# Patient Record
Sex: MALE | Race: WHITE | Employment: FULL TIME | URBAN - METROPOLITAN AREA
[De-identification: names, ages, dates, MRNs, and addresses within clinical notes are randomized per-mention and may not be internally consistent; named-entity substitution may affect disease eponyms.]

---

## 2024-02-22 ENCOUNTER — OFFICE VISIT (OUTPATIENT)
Age: 49
End: 2024-02-22
Payer: COMMERCIAL

## 2024-02-22 VITALS
DIASTOLIC BLOOD PRESSURE: 78 MMHG | BODY MASS INDEX: 24.25 KG/M2 | HEART RATE: 84 BPM | SYSTOLIC BLOOD PRESSURE: 120 MMHG | TEMPERATURE: 97.8 F | HEIGHT: 68 IN | WEIGHT: 160 LBS | OXYGEN SATURATION: 99 % | RESPIRATION RATE: 18 BRPM

## 2024-02-22 DIAGNOSIS — G44.86 CERVICOGENIC HEADACHE: ICD-10-CM

## 2024-02-22 DIAGNOSIS — M54.81 BILATERAL OCCIPITAL NEURALGIA: Primary | ICD-10-CM

## 2024-02-22 DIAGNOSIS — G81.11 RIGHT SPASTIC HEMIPARESIS (HCC): ICD-10-CM

## 2024-02-22 DIAGNOSIS — G24.3 CERVICAL DYSTONIA: ICD-10-CM

## 2024-02-22 PROCEDURE — 99204 OFFICE O/P NEW MOD 45 MIN: CPT | Performed by: PSYCHIATRY & NEUROLOGY

## 2024-02-22 RX ORDER — BUTALBITAL, ACETAMINOPHEN AND CAFFEINE 300; 40; 50 MG/1; MG/1; MG/1
1 CAPSULE ORAL EVERY 4 HOURS PRN
COMMUNITY
Start: 2024-02-16

## 2024-02-22 RX ORDER — BACLOFEN 10 MG/1
TABLET ORAL
Qty: 90 TABLET | Refills: 3 | Status: SHIPPED | OUTPATIENT
Start: 2024-02-22

## 2024-02-22 RX ORDER — NAPROXEN SODIUM 550 MG/1
550 TABLET ORAL 2 TIMES DAILY PRN
COMMUNITY
Start: 2024-02-04

## 2024-02-22 ASSESSMENT — PATIENT HEALTH QUESTIONNAIRE - PHQ9
SUM OF ALL RESPONSES TO PHQ9 QUESTIONS 1 & 2: 0
1. LITTLE INTEREST OR PLEASURE IN DOING THINGS: 0
SUM OF ALL RESPONSES TO PHQ QUESTIONS 1-9: 0
SUM OF ALL RESPONSES TO PHQ QUESTIONS 1-9: 0
2. FEELING DOWN, DEPRESSED OR HOPELESS: 0
SUM OF ALL RESPONSES TO PHQ QUESTIONS 1-9: 0
SUM OF ALL RESPONSES TO PHQ QUESTIONS 1-9: 0

## 2024-02-22 NOTE — PROGRESS NOTES
most when sleeping.  Advised to obtain travel donut to prevent head flexion when sleeping.  Advised to obtain back support.  Patient given brochure for neck and shoulder exercises to do.  Head CT without contrast was ordered to further assess.  Trial of baclofen 10 mg at bedtime initially and up to 10 mg 3 times daily if necessary.  Prescription was provided.  Potential referral to physical therapy for more aggressive manipulation and dry needling.  Potential occipital nerve blocks if condition worsens.    Exam also reveals elements of cervical dystonia.  Patient has an anterior head posture with right laterocollis and torticollis.  Conservative management as above.  Posture changes, neck and shoulder exercises and trial of baclofen.  Potential referral to physical therapy.  If condition persist then patient may benefit from chemodenervation with Botox.    Exam reveals residual spastic hemiparesis right upper greater than lower extremity with flexion posturing at the elbow, wrist and fingers with atrophy and right plantarflexion.  Residual changes from childhood issue status postcraniotomy.  Head CT without contrast was ordered.  Baclofen as above should also help his underlying spasticity.  All questions and concerns were answered.    This note was created using voice recognition software. Despite editing, there may be syntax errors.

## 2024-02-22 NOTE — PATIENT INSTRUCTIONS
your arms at your sides.  Keep your shoulders relaxed and down, not shrugged.  Squeeze your shoulder blades together. Hold for 6 seconds, then relax.  Repeat 8 to 12 times.  Chest-level pull (arms straight)    Sit or stand up straight. Grasp an exercise band with your hands about shoulder-width apart.  Relax your shoulders. With your palms facing down, hold your arms straight out in front of you.  Slowly pull straight out to the sides, squeezing your shoulder blades together. Keep your arms straight and at chest level. Do not pull your shoulders up toward your ears. Hold for 1 to 2 seconds.  Slowly return to your starting position.  Repeat 8 to 12 times.  Rowing    [place] your elastic tubing or band at about waist height. Take one end in each hand.  Sit or stand with your feet hip-width apart.  Hold your arms straight in front of you. Adjust your distance to create slight tension in the tubing or band.  Slightly tuck your chin. Relax your shoulders.  Without shrugging your shoulders, pull straight back. Your elbows will pass alongside your waist.  Pull-downs    [place] your elastic tubing or band in the top of a closed door. Take one end in each hand.  Either sit or stand, depending on what is more comfortable. If you feel unsteady, sit on a chair.  Start with your arms up and comfortably apart, elbows straight. There should be a slight tension in the tubing or band.  Slightly tuck your chin, and look straight ahead.  Keeping your back straight, slowly pull down and back, bending your elbows.  Stop where your hands are level with your chin, in a \"goalpost\" position.  Repeat 8 to 12 times.  Chest T stretch    Lie on your back with your knees bent and your feet about hip-width apart.  Tuck your chin, and relax your shoulders.  Reach your arms straight out to the sides. If you don't feel a mild stretch in your shoulders and across your chest, use a foam roll or a tightly rolled towel or blanket under your spine, from

## 2024-03-05 ENCOUNTER — HOSPITAL ENCOUNTER (OUTPATIENT)
Facility: HOSPITAL | Age: 49
Discharge: HOME | End: 2024-03-08
Attending: PSYCHIATRY & NEUROLOGY
Payer: COMMERCIAL

## 2024-03-05 DIAGNOSIS — M54.81 BILATERAL OCCIPITAL NEURALGIA: ICD-10-CM

## 2024-03-05 DIAGNOSIS — G81.11 RIGHT SPASTIC HEMIPARESIS (HCC): ICD-10-CM

## 2024-03-05 PROCEDURE — 70450 CT HEAD/BRAIN W/O DYE: CPT

## 2024-05-23 ENCOUNTER — OFFICE VISIT (OUTPATIENT)
Age: 49
End: 2024-05-23
Payer: COMMERCIAL

## 2024-05-23 VITALS
HEART RATE: 81 BPM | HEIGHT: 68 IN | OXYGEN SATURATION: 97 % | WEIGHT: 170 LBS | DIASTOLIC BLOOD PRESSURE: 78 MMHG | RESPIRATION RATE: 18 BRPM | BODY MASS INDEX: 25.76 KG/M2 | TEMPERATURE: 98 F | SYSTOLIC BLOOD PRESSURE: 134 MMHG

## 2024-05-23 DIAGNOSIS — G44.86 CERVICOGENIC HEADACHE: ICD-10-CM

## 2024-05-23 DIAGNOSIS — G24.3 CERVICAL DYSTONIA: ICD-10-CM

## 2024-05-23 DIAGNOSIS — M54.81 BILATERAL OCCIPITAL NEURALGIA: Primary | ICD-10-CM

## 2024-05-23 DIAGNOSIS — G81.11 RIGHT SPASTIC HEMIPARESIS (HCC): ICD-10-CM

## 2024-05-23 PROCEDURE — 99214 OFFICE O/P EST MOD 30 MIN: CPT | Performed by: PSYCHIATRY & NEUROLOGY

## 2024-05-23 NOTE — PROGRESS NOTES
NEUROLOGY CLINIC NOTE    Patient ID:  Patti Stein  [id number]  49 y.o.  1975    Date of Visit:  May 23, 2024    Referring Physician: Dr. Andrew Coates    Reason for Visit:  headache    Chief Complaint   Patient presents with    Headache     3 month follow up- patient reports he has had 5 headaches in the last 30 days. Patient states headaches are less frequent.    Neck Pain     3 month follow up- patient reports neck pain has improved.       History of Present Illness:     There are no problems to display for this patient.    No past medical history on file.   No past surgical history on file.       Current Outpatient Medications on File Prior to Visit   Medication Sig Dispense Refill    baclofen (LIORESAL) 10 MG tablet Take 1 at bedtime x 1 wk; then 1 BID x 1 wk; then 1 TID (Patient taking differently: Take 1 tablet by mouth nightly Take 1 at bedtime x 1 wk; then 1 BID x 1 wk; then 1 TID) 90 tablet 3     No current facility-administered medications on file prior to visit.       No Known Allergies   Social History     Tobacco Use    Smoking status: Never    Smokeless tobacco: Never   Substance Use Topics    Alcohol use: Yes     Comment: very rarely      Family History   Problem Relation Age of Onset    Headache Brother         Subjective:      Patti Stein is a 49 y.o. male with history of craniotomy as a child with residual spastic right hemiparesis who was referred here by Noelle Garcia NP for further evaluation of his headaches.  Patient diagnosed with occipital neuralgia, cervicogenic headaches, cervical dystonia and is here for follow-up.    Headache worse the past 2 months  Prior headache but did not last this long  Daily that comes and goes  Gradual onset  Bifrontal, R>L temple and occiput area  Ache pain  At its worst a 4/10  No associated vision changes, light or noise sensitivity, dizziness, nausea/vomiting  Tension in the neck  No known precipitant. No worsening or relieving factors.    Works in

## 2024-07-17 DIAGNOSIS — G44.86 CERVICOGENIC HEADACHE: ICD-10-CM

## 2024-07-17 DIAGNOSIS — G81.11 RIGHT SPASTIC HEMIPARESIS (HCC): ICD-10-CM

## 2024-07-17 DIAGNOSIS — M54.81 BILATERAL OCCIPITAL NEURALGIA: ICD-10-CM

## 2024-07-17 RX ORDER — BACLOFEN 10 MG/1
TABLET ORAL
Qty: 90 TABLET | Refills: 3 | Status: SHIPPED | OUTPATIENT
Start: 2024-07-17